# Patient Record
Sex: MALE | Race: WHITE | NOT HISPANIC OR LATINO | URBAN - METROPOLITAN AREA
[De-identification: names, ages, dates, MRNs, and addresses within clinical notes are randomized per-mention and may not be internally consistent; named-entity substitution may affect disease eponyms.]

---

## 2022-09-13 ENCOUNTER — APPOINTMENT (RX ONLY)
Dept: URBAN - METROPOLITAN AREA CLINIC 164 | Facility: CLINIC | Age: 32
Setting detail: DERMATOLOGY
End: 2022-09-13

## 2022-09-13 DIAGNOSIS — L72.8 OTHER FOLLICULAR CYSTS OF THE SKIN AND SUBCUTANEOUS TISSUE: ICD-10-CM | Status: WELL CONTROLLED

## 2022-09-13 DIAGNOSIS — D18.0 HEMANGIOMA: ICD-10-CM

## 2022-09-13 DIAGNOSIS — L81.4 OTHER MELANIN HYPERPIGMENTATION: ICD-10-CM

## 2022-09-13 DIAGNOSIS — D22 MELANOCYTIC NEVI: ICD-10-CM

## 2022-09-13 PROBLEM — D18.01 HEMANGIOMA OF SKIN AND SUBCUTANEOUS TISSUE: Status: ACTIVE | Noted: 2022-09-13

## 2022-09-13 PROBLEM — D22.5 MELANOCYTIC NEVI OF TRUNK: Status: ACTIVE | Noted: 2022-09-13

## 2022-09-13 PROCEDURE — ? CONSULTATION EXCISION

## 2022-09-13 PROCEDURE — ? FULL BODY SKIN EXAM

## 2022-09-13 PROCEDURE — 99203 OFFICE O/P NEW LOW 30 MIN: CPT

## 2022-09-13 PROCEDURE — ? COUNSELING

## 2022-09-13 ASSESSMENT — LOCATION SIMPLE DESCRIPTION DERM
LOCATION SIMPLE: LEFT FOREARM
LOCATION SIMPLE: LEFT UPPER BACK
LOCATION SIMPLE: CHEST
LOCATION SIMPLE: RIGHT UPPER BACK

## 2022-09-13 ASSESSMENT — LOCATION DETAILED DESCRIPTION DERM
LOCATION DETAILED: RIGHT MID-UPPER BACK
LOCATION DETAILED: LEFT MID-UPPER BACK
LOCATION DETAILED: LEFT LATERAL SUPERIOR CHEST
LOCATION DETAILED: LEFT DISTAL RADIAL DORSAL FOREARM

## 2022-09-13 ASSESSMENT — LOCATION ZONE DERM
LOCATION ZONE: TRUNK
LOCATION ZONE: ARM

## 2023-03-01 ENCOUNTER — APPOINTMENT (OUTPATIENT)
Dept: LAB | Facility: CLINIC | Age: 33
End: 2023-03-01

## 2023-03-01 ENCOUNTER — APPOINTMENT (OUTPATIENT)
Dept: RADIOLOGY | Facility: CLINIC | Age: 33
End: 2023-03-01

## 2023-03-01 DIAGNOSIS — Z02.1 PRE-EMPLOYMENT HEALTH SCREENING EXAMINATION: ICD-10-CM

## 2023-03-02 LAB — ZPP RBC-MCNC: 32 UG/DL (ref 0–36)

## 2023-03-06 LAB — MISCELLANEOUS LAB TEST RESULT: NORMAL

## 2023-08-24 ENCOUNTER — OFFICE VISIT (OUTPATIENT)
Dept: FAMILY MEDICINE CLINIC | Facility: CLINIC | Age: 33
End: 2023-08-24
Payer: COMMERCIAL

## 2023-08-24 ENCOUNTER — TELEPHONE (OUTPATIENT)
Dept: FAMILY MEDICINE CLINIC | Facility: CLINIC | Age: 33
End: 2023-08-24

## 2023-08-24 VITALS
HEIGHT: 69 IN | SYSTOLIC BLOOD PRESSURE: 140 MMHG | TEMPERATURE: 97.5 F | OXYGEN SATURATION: 98 % | WEIGHT: 193 LBS | BODY MASS INDEX: 28.58 KG/M2 | RESPIRATION RATE: 16 BRPM | DIASTOLIC BLOOD PRESSURE: 80 MMHG | HEART RATE: 57 BPM

## 2023-08-24 DIAGNOSIS — Z11.4 SCREENING FOR HIV (HUMAN IMMUNODEFICIENCY VIRUS): ICD-10-CM

## 2023-08-24 DIAGNOSIS — Z13.6 SCREENING FOR CARDIOVASCULAR CONDITION: ICD-10-CM

## 2023-08-24 DIAGNOSIS — T63.444A ANAPHYLACTIC REACTION TO BEE STING, UNDETERMINED INTENT, INITIAL ENCOUNTER: ICD-10-CM

## 2023-08-24 DIAGNOSIS — Z30.09 SCREENING AND EVALUATION FOR VASECTOMY: ICD-10-CM

## 2023-08-24 DIAGNOSIS — Z00.00 WELL ADULT EXAM: Primary | ICD-10-CM

## 2023-08-24 DIAGNOSIS — Z11.59 NEED FOR HEPATITIS C SCREENING TEST: ICD-10-CM

## 2023-08-24 PROBLEM — T78.2XXA BEE STING-INDUCED ANAPHYLAXIS: Status: ACTIVE | Noted: 2023-08-24

## 2023-08-24 PROBLEM — T63.441A BEE STING-INDUCED ANAPHYLAXIS: Status: ACTIVE | Noted: 2023-08-24

## 2023-08-24 PROBLEM — E80.6 HYPERBILIRUBINEMIA: Status: ACTIVE | Noted: 2023-08-24

## 2023-08-24 PROCEDURE — 99385 PREV VISIT NEW AGE 18-39: CPT | Performed by: FAMILY MEDICINE

## 2023-08-24 RX ORDER — EPINEPHRINE 0.3 MG/.3ML
0.3 INJECTION SUBCUTANEOUS ONCE
Qty: 0.6 ML | Refills: 5 | Status: SHIPPED | OUTPATIENT
Start: 2023-08-24 | End: 2023-08-24

## 2023-08-24 NOTE — PROGRESS NOTES
FAMILY PRACTICE HEALTH MAINTENANCE OFFICE VISIT  [facility] - [facility]    NAME: Cristobal Davila  AGE: 29 y.o. SEX: male  : 1990     DATE: 2023    Assessment and Plan     1. Well adult exam    2. Anaphylactic reaction to bee sting, undetermined intent, initial encounter  -     EPINEPHrine (EpiPen 2-Onesimo) 0.3 mg/0.3 mL SOAJ; Inject 0.3 mL (0.3 mg total) into a muscle once for 1 dose Bee allergy    3. Screening for cardiovascular condition  -     CBC; Future  -     Comprehensive metabolic panel; Future  -     Lipid Panel with Direct LDL reflex; Future    4. Need for hepatitis C screening test  -     Hepatitis C antibody; Future    5. Screening for HIV (human immunodeficiency virus)  -     [facility]; Future    6. BMI 28.0-28.9,adult    7. Screening and evaluation for vasectomy  -     Ambulatory Referral to Urology; Future        Patient Counseling:   Nutrition: Stressed importance of a well balanced diet, moderation of sodium/saturated fat, caloric balance and sufficient intake of fiber  Exercise: Stressed the importance of regular exercise with a goal of 150 minutes per week  Dental Health: Discussed daily flossing and brushing and regular dental visits     Immunizations reviewed: Up To Date  Discussed benefits of:  Screening labs. BMI Counseling: Body mass index is 28.5 kg/m². Discussed with patient's BMI with him. The BMI is above normal. Nutrition recommendations include decreasing overall calorie intake.     Return in about 1 year (around 2024) for Annual physical.        Chief Complaint     Chief Complaint   Patient presents with   • Physical Exam     NPT  [id number]       History of Present Illness     Pt is here for the first time for a full physical  [facility]      Pt would like a [facility]      Well Adult Physical   Patient here for a comprehensive physical exam.      Diet and Physical Activity  Diet: well balanced diet  Exercise: frequently      Depression Screen  PHQ-2/9 Depression Screening    Little interest or pleasure in doing things: 0 - not at all  Feeling down, depressed, or hopeless: 0 - not at all  PHQ-2 Score: 0  PHQ-2 Interpretation: Negative depression screen          General Health  Hearing: Normal:  bilateral  Vision: no vision problems  Dental: regular dental visits    Reproductive Health  No issues       The following portions of the patient's history were reviewed and updated as appropriate: allergies, current medications, past family history, past medical history, past social history, past surgical history and problem list.    Review of Systems     Review of Systems   Constitutional: Negative for activity change, appetite change, chills, diaphoresis, fatigue, fever and unexpected weight change. HENT: Negative for congestion, dental problem, ear pain, mouth sores, sinus pressure, sinus pain, sore throat and trouble swallowing. Eyes: Negative for photophobia, discharge and itching. Respiratory: Negative for apnea, chest tightness and shortness of breath. Cardiovascular: Negative for chest pain, palpitations and leg swelling. Gastrointestinal: Negative for abdominal distention, abdominal pain, blood in stool, nausea and vomiting. Endocrine: Negative for cold intolerance, heat intolerance, polydipsia, polyphagia and polyuria. Genitourinary: Negative for difficulty urinating. Musculoskeletal: Negative for arthralgias. Skin: Negative for color change and wound. Neurological: Negative for dizziness, syncope, speech difficulty and headaches. Hematological: Negative for adenopathy. Psychiatric/Behavioral: Negative for agitation and behavioral problems. Past Medical History     History reviewed. No pertinent past medical history.     Past Surgical History     Past Surgical History:   Procedure Laterality Date   • ANTERIOR CRUCIATE LIGAMENT REPAIR Bilateral    • NASAL SEPTUM SURGERY N/A • WISDOM TOOTH EXTRACTION N/A        Social History     Social History     Socioeconomic History   • Marital status: Unknown     Spouse name: None   • Number of children: None   • Years of education: None   • Highest education level: None   Occupational History   • None   Tobacco Use   • Smoking status: Never     Passive exposure: Never   • Smokeless tobacco: Never   Substance and Sexual Activity   • Alcohol use: Yes     Alcohol/week: 1.0 standard drink of alcohol     Types: 1 Cans of beer per week   • Drug use: Never   • Sexual activity: Yes   Other Topics Concern   • None   Social History Narrative   • None     Social Determinants of Health     Financial Resource Strain: Not on file   Food Insecurity: Not on file   Transportation Needs: Not on file   Physical Activity: Not on file   Stress: Not on file   Social Connections: Not on file   Intimate Partner Violence: Not on file   Housing Stability: Not on file       Family History     Family History   Problem Relation Age of Onset   • No Known Problems Mother    • Hypertension Father    • Breast cancer Sister    • Mental illness Neg Hx        Current Medications       Current Outpatient Medications:   •  EPINEPHrine (EpiPen 2-Onesimo) 0.3 mg/0.3 mL SOAJ, Inject 0.3 mL (0.3 mg total) into a muscle once for 1 dose Bee allergy, Disp: 0.6 mL, Rfl: 5  •  Multiple Vitamin (MULTIVITAMIN ADULT PO), Take by mouth, Disp: , Rfl:      Allergies     No Known Allergies    Objective     /80 (BP Location: Right arm, Patient Position: Sitting, Cuff Size: Standard)   Pulse 57   Temp 97.5 °F (36.4 °C) (Temporal)   Resp 16   Ht 5' 9" (1.753 m)   Wt 87.5 kg (193 lb)   SpO2 98%   BMI 28.50 kg/m²      Physical Exam  Vitals and nursing note reviewed. Constitutional:       General: He is not in acute distress. Appearance: He is well-developed. He is not diaphoretic. HENT:      Head: Normocephalic and atraumatic.       Right Ear: External ear normal.      Left Ear: External ear normal.      Nose: Nose normal.      Mouth/Throat:      Pharynx: No oropharyngeal exudate. Eyes:      General: No scleral icterus. Right eye: No discharge. Left eye: No discharge. Pupils: Pupils are equal, round, and reactive to light. Neck:      Thyroid: No thyromegaly. Cardiovascular:      Rate and Rhythm: Normal rate. Heart sounds: Normal heart sounds. No murmur heard. Pulmonary:      Effort: Pulmonary effort is normal. No respiratory distress. Breath sounds: Normal breath sounds. No wheezing. Abdominal:      General: Bowel sounds are normal. There is no distension. Palpations: Abdomen is soft. There is no mass. Tenderness: There is no abdominal tenderness. There is no guarding or rebound. Musculoskeletal:         General: Normal range of motion. Skin:     General: Skin is warm and dry. Findings: No erythema or rash. Neurological:      Mental Status: He is alert. Coordination: Coordination normal.      Deep Tendon Reflexes: Reflexes normal.   Psychiatric:         Behavior: Behavior normal.           Vision Screening    Right eye Left eye Both eyes   Without correction 20/20 20/20 20/20   With correction              Jose Cruz Murrya [facility]  BMI Counseling: Body mass index is 28.5 kg/m². The BMI is above normal. Nutrition recommendations include reducing portion sizes.

## 2023-08-24 NOTE — TELEPHONE ENCOUNTER
Patient was here this morning and got an RX for an epipen. The pharmacy called and stated that a prior auth is needed for it. Please start a prior auth and then update patient once completed.     Phone [phone]       [address] [place] [address]

## 2023-08-24 NOTE — TELEPHONE ENCOUNTER
Dalia Rivera [phone]  ID [id number]  PA done over the phone  Difficulties with phone number and address. Need Clarification with pt.   Pharmacy Insurance and Office are not the same in address  ID does not seem to be running on CIgna's end Will retry tomorrow   Zechariah Hotter

## 2023-08-24 NOTE — PATIENT INSTRUCTIONS
Weight Management   AMBULATORY CARE:   Why it is important to manage your weight:  Being overweight increases your risk of health conditions such as heart disease, high blood pressure, type 2 diabetes, and certain types of cancer. It can also increase your risk for osteoarthritis, sleep apnea, and other respiratory problems. Aim for a slow, steady weight loss. Even a small amount of weight loss can lower your risk of health problems. Risks of being overweight:  Extra weight can cause many health problems, including the following:  • Diabetes (high blood sugar level)    • High blood pressure or high cholesterol    • Heart disease    • Stroke    • Gallbladder or liver disease    • Cancer of the colon, breast, prostate, liver, or kidney    • Sleep apnea    • Arthritis or gout    Screening  is done to check for health conditions before you have signs or symptoms. If you are 28to 79years old, your blood sugar level may be checked every 3 years for signs of prediabetes or diabetes. Your healthcare provider will check your blood pressure at each visit. High blood pressure can lead to a stroke or other problems. Your provider may check for signs of heart disease, cancer, or other health problems. How to lose weight safely:  A safe and healthy way to lose weight is to eat fewer calories and get regular exercise. • You can lose up about 1 pound a week by decreasing the number of calories you eat by 500 calories each day. You can decrease calories by eating smaller portion sizes or by cutting out high-calorie foods. Read labels to find out how many calories are in the foods you eat. • You can also burn calories with exercise such as walking, swimming, or biking. You will be more likely to keep weight off if you make these changes part of your lifestyle. Exercise at least 30 minutes per day on most days of the week.  You can also fit in more physical activity by taking the stairs instead of the elevator or parking farther away from stores. Ask your healthcare provider about the best exercise plan for you. Healthy meal plan for weight management:  A healthy meal plan includes a variety of foods, contains fewer calories, and helps you stay healthy. A healthy meal plan includes the following:     • Eat whole-grain foods more often. A healthy meal plan should contain fiber. Fiber is the part of grains, fruits, and vegetables that is not broken down by your body. Whole-grain foods are healthy and provide extra fiber in your diet. Some examples of whole-grain foods are whole-wheat breads and pastas, oatmeal, brown rice, and bulgur. • Eat a variety of vegetables every day. Include dark, leafy greens such as spinach, kale, maria c greens, and mustard greens. Eat yellow and orange vegetables such as carrots, sweet potatoes, and winter squash. • Eat a variety of fruits every day. Choose fresh or canned fruit (canned in its own juice or light syrup) instead of juice. Fruit juice has very little or no fiber. • Eat low-fat dairy foods. Drink fat-free (skim) milk or 1% milk. Eat fat-free yogurt and low-fat cottage cheese. Try low-fat cheeses such as mozzarella and other reduced-fat cheeses. • Choose meat and other protein foods that are low in fat. Choose beans or other legumes such as split peas or lentils. Choose fish, skinless poultry (chicken or turkey), or lean cuts of red meat (beef or pork). Before you cook meat or poultry, cut off any visible fat. • Use less fat and oil. Try baking foods instead of frying them. Add less fat, such as margarine, sour cream, regular salad dressing and mayonnaise to foods. Eat fewer high-fat foods. Some examples of high-fat foods include french fries, doughnuts, ice cream, and cakes. • Eat fewer sweets. Limit foods and drinks that are high in sugar. This includes candy, cookies, regular soda, and sweetened drinks. Ways to decrease calories:   • Eat smaller portions. ? Use a small plate with smaller servings. ? Do not eat second helpings. ? When you eat at a restaurant, ask for a box and place half of your meal in the box before you eat. ? Share an entrée with someone else. • Replace high-calorie snacks with healthy, low-calorie snacks. ? Choose fresh fruit, vegetables, fat-free rice cakes, or air-popped popcorn instead of potato chips, nuts, or chocolate. ? Choose water or calorie-free drinks instead of soda or sweetened drinks. • Do not shop for groceries when you are hungry. You may be more likely to make unhealthy food choices. Take a grocery list of healthy foods and shop after you have eaten. • Eat regular meals. Do not skip meals. Skipping meals can lead to overeating later in the day. This can make it harder for you to lose weight. Eat a healthy snack in place of a meal if you do not have time to eat a regular meal. Talk with a dietitian to help you create a meal plan and schedule that is right for you. Other things to consider as you try to lose weight:   • Be aware of situations that may give you the urge to overeat, such as eating while watching television. Find ways to avoid these situations. For example, read a book, go for a walk, or do crafts. • Meet with a weight loss support group or friends who are also trying to lose weight. This may help you stay motivated to continue working on your weight loss goals. © Copyright [employer] 2022 Information is for End User's use only and may not be sold, redistributed or otherwise used for commercial purposes. The above information is an  only. It is not intended as medical advice for individual conditions or treatments. Talk to your doctor, nurse or pharmacist before following any medical regimen to see if it is safe and effective for you.

## 2023-08-25 NOTE — TELEPHONE ENCOUNTER
Message left for a call back.   Clarification needed on address phone and ID  Needing to ask about phone numbers on back of Insurance as well to call for PA approval   warren

## 2023-08-29 NOTE — TELEPHONE ENCOUNTER
Pt called today giving correct address and verified Insurance card.   Will check on Epi now that all is correct on file.  Elizabeth Lord

## 2023-08-30 NOTE — TELEPHONE ENCOUNTER
PA was approved [facility] is ordering the correct covered brand  Message left for the pt to  tomorrow in the afternoon.   NFA  [id number]

## 2023-11-09 ENCOUNTER — TELEPHONE (OUTPATIENT)
Dept: FAMILY MEDICINE CLINIC | Facility: CLINIC | Age: 33
End: 2023-11-09

## 2023-11-09 DIAGNOSIS — E78.00 ELEVATED LOW-DENSITY LIPOPROTEIN LEVEL: ICD-10-CM

## 2023-11-09 DIAGNOSIS — R74.8 ABNORMAL LIVER ENZYMES: Primary | ICD-10-CM

## 2023-11-09 LAB
ALBUMIN SERPL-MCNC: 4.5 G/DL (ref 4.1–5.1)
ALBUMIN/GLOB SERPL: 1.6 {RATIO} (ref 1.2–2.2)
ALP SERPL-CCNC: 62 IU/L (ref 44–121)
ALT SERPL-CCNC: 56 IU/L (ref 0–44)
AST SERPL-CCNC: 34 IU/L (ref 0–40)
BASOPHILS # BLD AUTO: 0 X10E3/UL (ref 0–0.2)
BASOPHILS NFR BLD AUTO: 0 %
BILIRUB SERPL-MCNC: 2.4 MG/DL (ref 0–1.2)
BUN SERPL-MCNC: 16 MG/DL (ref 6–20)
BUN/CREAT SERPL: 19 (ref 9–20)
CALCIUM SERPL-MCNC: 10.2 MG/DL (ref 8.7–10.2)
CHLORIDE SERPL-SCNC: 102 MMOL/L (ref 96–106)
CHOLEST SERPL-MCNC: 178 MG/DL (ref 100–199)
CO2 SERPL-SCNC: 23 MMOL/L (ref 20–29)
CREAT SERPL-MCNC: 0.83 MG/DL (ref 0.76–1.27)
EGFR: 120 ML/MIN/1.73
EOSINOPHIL # BLD AUTO: 0.1 X10E3/UL (ref 0–0.4)
EOSINOPHIL NFR BLD AUTO: 2 %
ERYTHROCYTE [DISTWIDTH] IN BLOOD BY AUTOMATED COUNT: 13.1 % (ref 11.6–15.4)
GLOBULIN SER-MCNC: 2.9 G/DL (ref 1.5–4.5)
GLUCOSE SERPL-MCNC: 96 MG/DL (ref 70–99)
HCT VFR BLD AUTO: 48 % (ref 37.5–51)
HCV AB S/CO SERPL IA: NON REACTIVE
HDLC SERPL-MCNC: 52 MG/DL
HGB BLD-MCNC: 16.5 G/DL (ref 13–17.7)
HIV 1+2 AB+HIV1 P24 AG SERPL QL IA: NON REACTIVE
IMM GRANULOCYTES # BLD: 0 X10E3/UL (ref 0–0.1)
IMM GRANULOCYTES NFR BLD: 0 %
LDLC SERPL CALC-MCNC: 108 MG/DL (ref 0–99)
LYMPHOCYTES # BLD AUTO: 2 X10E3/UL (ref 0.7–3.1)
LYMPHOCYTES NFR BLD AUTO: 28 %
MCH RBC QN AUTO: 30.1 PG (ref 26.6–33)
MCHC RBC AUTO-ENTMCNC: 34.4 G/DL (ref 31.5–35.7)
MCV RBC AUTO: 87 FL (ref 79–97)
MICRODELETION SYND BLD/T FISH: NORMAL
MONOCYTES # BLD AUTO: 0.8 X10E3/UL (ref 0.1–0.9)
MONOCYTES NFR BLD AUTO: 10 %
NEUTROPHILS # BLD AUTO: 4.4 X10E3/UL (ref 1.4–7)
NEUTROPHILS NFR BLD AUTO: 60 %
PLATELET # BLD AUTO: 222 X10E3/UL (ref 150–450)
POTASSIUM SERPL-SCNC: 4.6 MMOL/L (ref 3.5–5.2)
PROT SERPL-MCNC: 7.4 G/DL (ref 6–8.5)
RBC # BLD AUTO: 5.49 X10E6/UL (ref 4.14–5.8)
SODIUM SERPL-SCNC: 140 MMOL/L (ref 134–144)
TRIGL SERPL-MCNC: 101 MG/DL (ref 0–149)
WBC # BLD AUTO: 7.3 X10E3/UL (ref 3.4–10.8)

## 2023-11-09 NOTE — TELEPHONE ENCOUNTER
Please call pt, one of his liver functions studies were elevatyed. This is likely fatty liver which is a benign condition. I will order and Ul;trasound of the liver to confirm. Pt also has an elevated LDL - which is the bad cholesterol.   I would suggets low fat low cholesterol diet and we will redraw in 6 months    Orders in

## 2023-11-14 ENCOUNTER — HOSPITAL ENCOUNTER (OUTPATIENT)
Dept: ULTRASOUND IMAGING | Facility: HOSPITAL | Age: 33
Discharge: HOME | End: 2023-11-14
Payer: COMMERCIAL

## 2023-11-14 DIAGNOSIS — R74.8 ABNORMAL LIVER ENZYMES: ICD-10-CM

## 2023-11-14 PROCEDURE — 76705 ECHO EXAM OF ABDOMEN: CPT

## 2024-05-06 ENCOUNTER — OFFICE VISIT (OUTPATIENT)
Dept: FAMILY MEDICINE CLINIC | Facility: CLINIC | Age: 34
End: 2024-05-06
Payer: COMMERCIAL

## 2024-05-06 VITALS
RESPIRATION RATE: 18 BRPM | HEART RATE: 62 BPM | WEIGHT: 210 LBS | TEMPERATURE: 97.8 F | SYSTOLIC BLOOD PRESSURE: 108 MMHG | DIASTOLIC BLOOD PRESSURE: 68 MMHG | HEIGHT: 69 IN | BODY MASS INDEX: 31.1 KG/M2

## 2024-05-06 DIAGNOSIS — J01.00 ACUTE NON-RECURRENT MAXILLARY SINUSITIS: ICD-10-CM

## 2024-05-06 DIAGNOSIS — J30.2 SEASONAL ALLERGIES: Primary | ICD-10-CM

## 2024-05-06 PROCEDURE — 99213 OFFICE O/P EST LOW 20 MIN: CPT | Performed by: FAMILY MEDICINE

## 2024-05-06 RX ORDER — AZITHROMYCIN 250 MG/1
TABLET, FILM COATED ORAL
Qty: 6 TABLET | Refills: 0 | Status: SHIPPED | OUTPATIENT
Start: 2024-05-06 | End: 2024-05-11

## 2024-05-06 RX ORDER — FLUTICASONE PROPIONATE 50 MCG
50 SPRAY, SUSPENSION (ML) NASAL DAILY
COMMUNITY
Start: 2024-02-21

## 2024-05-06 RX ORDER — METHYLPREDNISOLONE 4 MG/1
TABLET ORAL
Qty: 21 EACH | Refills: 0 | Status: SHIPPED | OUTPATIENT
Start: 2024-05-06 | End: 2024-05-17 | Stop reason: ALTCHOICE

## 2024-05-06 RX ORDER — LORATADINE 10 MG/1
10 TABLET ORAL DAILY
COMMUNITY

## 2024-05-06 RX ORDER — MONTELUKAST SODIUM 10 MG/1
10 TABLET ORAL
Qty: 90 TABLET | Refills: 0 | Status: SHIPPED | OUTPATIENT
Start: 2024-05-06

## 2024-05-06 NOTE — PROGRESS NOTES
Assessment/Plan:    1. Seasonal allergies  -     montelukast (SINGULAIR) 10 mg tablet; Take 1 tablet (10 mg total) by mouth daily at bedtime    2. Acute non-recurrent maxillary sinusitis  -     methylPREDNISolone 4 MG tablet therapy pack; Use as directed on package  -     azithromycin (ZITHROMAX) 250 mg tablet; 2 tabs on day 1, 1 tab a day for 4 days after            There are no Patient Instructions on file for this visit.    No follow-ups on file.    Subjective:      Patient ID: Akira Lucero is a 33 y.o. male.    Chief Complaint   Patient presents with   • seasonal allergies     Symptoms started Friday  YC   • Sore Throat     Painful to swallow   • Earache     Both ears; usually when laying down    • Cough       Pt states he startted with allergies a few years ago  Continuously yearly had dry scratchy throat, sinus oressure,   Warm comopresses help some  Then mucousy congestion, chest congestion          The following portions of the patient's history were reviewed and updated as appropriate: allergies, current medications, past family history, past medical history, past social history, past surgical history and problem list.    Review of Systems   HENT:  Positive for congestion, ear pain, postnasal drip, sinus pain and sore throat.          Current Outpatient Medications   Medication Sig Dispense Refill   • azithromycin (ZITHROMAX) 250 mg tablet 2 tabs on day 1, 1 tab a day for 4 days after 6 tablet 0   • EPINEPHrine (EpiPen 2-Onesimo) 0.3 mg/0.3 mL SOAJ Inject 0.3 mL (0.3 mg total) into a muscle once for 1 dose Bee allergy 0.6 mL 5   • fluticasone (FLONASE) 50 mcg/act nasal spray 50 mcg into each nostril daily     • loratadine (CLARITIN) 10 mg tablet Take 10 mg by mouth daily     • methylPREDNISolone 4 MG tablet therapy pack Use as directed on package 21 each 0   • montelukast (SINGULAIR) 10 mg tablet Take 1 tablet (10 mg total) by mouth daily at bedtime 90 tablet 0   • Multiple Vitamin (MULTIVITAMIN ADULT PO) Take  "by mouth       No current facility-administered medications for this visit.       Objective:    /68   Pulse 62   Temp 97.8 °F (36.6 °C) (Tympanic)   Resp 18   Ht 5' 9\" (1.753 m)   Wt 95.3 kg (210 lb)   BMI 31.01 kg/m²        Physical Exam  HENT:      Nose: Congestion and rhinorrhea present.                Frank Lombardi, DO  "

## 2024-05-17 ENCOUNTER — OFFICE VISIT (OUTPATIENT)
Dept: URGENT CARE | Facility: CLINIC | Age: 34
End: 2024-05-17
Payer: COMMERCIAL

## 2024-05-17 VITALS
BODY MASS INDEX: 30.57 KG/M2 | WEIGHT: 207 LBS | SYSTOLIC BLOOD PRESSURE: 122 MMHG | RESPIRATION RATE: 20 BRPM | DIASTOLIC BLOOD PRESSURE: 80 MMHG | HEART RATE: 64 BPM | OXYGEN SATURATION: 97 % | TEMPERATURE: 97.6 F

## 2024-05-17 DIAGNOSIS — J01.11 ACUTE RECURRENT FRONTAL SINUSITIS: Primary | ICD-10-CM

## 2024-05-17 DIAGNOSIS — R05.1 ACUTE COUGH: ICD-10-CM

## 2024-05-17 PROCEDURE — 99213 OFFICE O/P EST LOW 20 MIN: CPT

## 2024-05-17 PROCEDURE — 87636 SARSCOV2 & INF A&B AMP PRB: CPT

## 2024-05-17 RX ORDER — PREDNISONE 20 MG/1
40 TABLET ORAL DAILY
Qty: 10 TABLET | Refills: 0 | Status: SHIPPED | OUTPATIENT
Start: 2024-05-17 | End: 2024-05-22

## 2024-05-17 RX ORDER — AMOXICILLIN AND CLAVULANATE POTASSIUM 875; 125 MG/1; MG/1
1 TABLET, FILM COATED ORAL EVERY 12 HOURS SCHEDULED
Qty: 14 TABLET | Refills: 0 | Status: SHIPPED | OUTPATIENT
Start: 2024-05-17 | End: 2024-05-24

## 2024-05-17 NOTE — PROGRESS NOTES
[facility]        NAME: Akira Lucero is a 33 y.o. male  : 1990    MRN: [id number]  DATE: May 17, 2024  TIME: 12:43 PM    Assessment and Plan   Acute recurrent frontal sinusitis [J01.11]  1. Acute recurrent frontal sinusitis  amoxicillin-clavulanate (AUGMENTIN) 875-125 mg per tablet    predniSONE 20 mg tablet      2. Acute cough  Covid/Flu- Office Collect Normal        COVID/flu PCR sent to lab, will f/u if positive. Will trial alternative antibiotic and steroids as patient is still symptomatic. Advised to stop taking antibiotic if positive for COVID or flu. VSS in clinic, appears in no acute distress. Educated on use of OTC products for additional relief of symptoms. Advised close follow-up with PCP or to report to the ER if symptoms worsen. Patient verbalizes understanding and agreeable to plan.       Patient Instructions     Take antibiotic as directed for next week and steroids as directed for next 5 days. Complete entire course of therapy even if symptoms improve and take medication with food to avoid upset stomach. If positive for COVID or flu, stop taking antibiotic. May continue over-the-counter/prescribed products as previously directed. Follow-up with PCP in 3-5 days if no improvement of symptoms. Report to the ER if symptoms worsen.      If tests are performed, our office will contact you with results only if changes need to made to the care plan discussed with you at the visit. You can review your full results on [facility].    Chief Complaint     Chief Complaint   Patient presents with    Facial Pain    Earache     Was seen for sore throat  and treated with antibiotic, steroids and singular. Allergies did not resolve , but infection did resolve. Went on airplane flew out on  and returned on Wednesday reports increase in sinus pressure b/l clear overnight and pressure returned to right ear. Ear pressure resolved, now right eye pressure.   + nasal congestion ,patient is  using nasal wash several times daily. No fevers reported. Taking sudafed, antihistamine, Flonase and afrin which is not helping.     Nasal Congestion     Last dose of Sudafed this morning. Last dose of advil @ 10:00 am          History of Present Illness       33 year old male presents for evaluation of cough, ear pain, and congestion for the past 2 weeks. He was seen by his PCP 2 weeks ago and given a medrol dose pack and Zithromax. He reports mild improvement of symptoms but this past week he was on a plane and relates the pressure, congestion, and ear pain returned and has since worsened. He denies fevers, chills, body aches, or fatigue. He reports yesterday he went running and felt SOB that resolved within a few seconds but otherwise denies difficulty breathing. He has tried flonase, nasal saline, zyrtec, and Singulair with minimal relief.     Sinusitis  This is a recurrent problem. The current episode started 1 to 4 weeks ago. The problem is unchanged. There has been no fever. The pain is moderate. Associated symptoms include congestion, coughing, ear pain, headaches, sinus pressure and sneezing. Pertinent negatives include no chills, hoarse voice, neck pain, shortness of breath, sore throat or swollen glands. Past treatments include oral decongestants, nasal decongestants, antibiotics and saline nose sprays. The treatment provided mild relief.       Review of Systems   Review of Systems   Constitutional:  Negative for activity change, appetite change, chills, fatigue and fever.   HENT:  Positive for congestion, ear pain, postnasal drip, rhinorrhea, sinus pressure, sinus pain and sneezing. Negative for hoarse voice, sore throat and trouble swallowing.    Respiratory:  Positive for cough. Negative for chest tightness and shortness of breath.    Gastrointestinal:  Negative for abdominal pain, constipation, diarrhea, nausea and vomiting.   Musculoskeletal:  Negative for arthralgias, back pain, myalgias and neck  pain.   Skin:  Negative for color change and pallor.   Allergic/Immunologic: Positive for environmental allergies. Negative for food allergies.   Neurological:  Positive for headaches. Negative for dizziness and light-headedness.         Current Medications       Current Outpatient Medications:     amoxicillin-clavulanate (AUGMENTIN) 875-125 mg per tablet, Take 1 tablet by mouth every 12 (twelve) hours for 7 days, Disp: 14 tablet, Rfl: 0    fluticasone (FLONASE) 50 mcg/act nasal spray, 50 mcg into each nostril daily, Disp: , Rfl:     loratadine (CLARITIN) 10 mg tablet, Take 10 mg by mouth daily, Disp: , Rfl:     montelukast (SINGULAIR) 10 mg tablet, Take 1 tablet (10 mg total) by mouth daily at bedtime, Disp: 90 tablet, Rfl: 0    Multiple Vitamin (MULTIVITAMIN ADULT PO), Take by mouth, Disp: , Rfl:     predniSONE 20 mg tablet, Take 2 tablets (40 mg total) by mouth daily for 5 days, Disp: 10 tablet, Rfl: 0    EPINEPHrine (EpiPen 2-Onesimo) 0.3 mg/0.3 mL SOAJ, Inject 0.3 mL (0.3 mg total) into a muscle once for 1 dose Bee allergy, Disp: 0.6 mL, Rfl: 5    Current Allergies     Allergies as of 05/17/2024 - Reviewed 05/17/2024   Allergen Reaction Noted    Bee venom Hives 05/06/2024            The following portions of the patient's history were reviewed and updated as appropriate: allergies, current medications, past family history, past medical history, past social history, past surgical history and problem list.     History reviewed. No pertinent past medical history.    Past Surgical History:   Procedure Laterality Date    ANTERIOR CRUCIATE LIGAMENT REPAIR Bilateral     NASAL SEPTUM SURGERY N/A     WISDOM TOOTH EXTRACTION N/A        Family History   Problem Relation Age of Onset    No Known Problems Mother     Hypertension Father     Breast cancer Sister     Mental illness Neg Hx          Medications have been verified.        Objective   /80   Pulse 64   Temp 97.6 °F (36.4 °C) (Tympanic)   Resp 20   Wt 93.9 kg  (207 lb)   SpO2 97%   BMI 30.57 kg/m²        Physical Exam     Physical Exam  Vitals and nursing note reviewed.   Constitutional:       General: He is awake. He is not in acute distress.     Appearance: Normal appearance. He is well-developed and normal weight.   HENT:      Head: Normocephalic and atraumatic.      Right Ear: Hearing, ear canal and external ear normal. A middle ear effusion is present.      Left Ear: Hearing, ear canal and external ear normal. A middle ear effusion is present.      Nose: Congestion and rhinorrhea present. Rhinorrhea is clear.      Right Turbinates: Enlarged. Not swollen or pale.      Left Turbinates: Enlarged. Not swollen or pale.      Right Sinus: No maxillary sinus tenderness or frontal sinus tenderness.      Left Sinus: Maxillary sinus tenderness and frontal sinus tenderness present.      Mouth/Throat:      Lips: Pink.      Mouth: Mucous membranes are moist.      Pharynx: Oropharynx is clear. Uvula midline. No oropharyngeal exudate or posterior oropharyngeal erythema.   Eyes:      General: Allergic shiner present.         Right eye: No foreign body, discharge or hordeolum.         Left eye: Discharge present.No foreign body or hordeolum.      Conjunctiva/sclera:      Left eye: Left conjunctiva is injected.      Pupils: Pupils are equal, round, and reactive to light.      Comments: Left eye allergic shiners; watery discharge present   Cardiovascular:      Rate and Rhythm: Normal rate and regular rhythm.      Pulses: Normal pulses.      Heart sounds: Normal heart sounds.   Pulmonary:      Effort: Pulmonary effort is normal.      Breath sounds: Normal breath sounds.   Musculoskeletal:      Cervical back: Full passive range of motion without pain, normal range of motion and neck supple.   Lymphadenopathy:      Cervical: No cervical adenopathy.   Skin:     General: Skin is warm and dry.   Neurological:      General: No focal deficit present.      Mental Status: He is alert and  oriented to person, place, and time.   Psychiatric:         Mood and Affect: Mood normal.         Behavior: Behavior normal. Behavior is cooperative.         Thought Content: Thought content normal.         Judgment: Judgment normal.

## 2024-05-17 NOTE — PATIENT INSTRUCTIONS
Take antibiotic as directed for next week and steroids as directed for next 5 days. Complete entire course of therapy even if symptoms improve and take medication with food to avoid upset stomach. If positive for COVID or flu, stop taking antibiotic. May continue over-the-counter/prescribed products as previously directed. Follow-up with PCP in 3-5 days if no improvement of symptoms. Report to the ER if symptoms worsen.      If tests are performed, our office will contact you with results only if changes need to made to the care plan discussed with you at the visit. You can review your full results on [facility].

## 2024-05-20 LAB
FLUAV RNA RESP QL NAA+PROBE: NEGATIVE
FLUBV RNA RESP QL NAA+PROBE: NEGATIVE
SARS-COV-2 RNA RESP QL NAA+PROBE: NEGATIVE

## 2024-07-16 ENCOUNTER — TELEPHONE (OUTPATIENT)
Age: 34
End: 2024-07-16

## 2024-07-16 DIAGNOSIS — Z13.6 SCREENING FOR CARDIOVASCULAR CONDITION: Primary | ICD-10-CM

## 2024-07-16 NOTE — TELEPHONE ENCOUNTER
Patient has PHY on 8/29/24. He would like to have labs ordered prior to his visit, so they can be discussed at his visit.  If PCP could place orders for labs, he goes to [place].  He will need lab orders mailed to his home.  Thank you.

## 2024-08-20 LAB
ALBUMIN SERPL-MCNC: 4.6 G/DL (ref 4.1–5.1)
ALP SERPL-CCNC: 67 IU/L (ref 44–121)
ALT SERPL-CCNC: 58 IU/L (ref 0–44)
AST SERPL-CCNC: 28 IU/L (ref 0–40)
BILIRUB SERPL-MCNC: 2.7 MG/DL (ref 0–1.2)
BUN SERPL-MCNC: 19 MG/DL (ref 6–20)
BUN/CREAT SERPL: 19 (ref 9–20)
CALCIUM SERPL-MCNC: 10.1 MG/DL (ref 8.7–10.2)
CHLORIDE SERPL-SCNC: 102 MMOL/L (ref 96–106)
CHOLEST SERPL-MCNC: 216 MG/DL (ref 100–199)
CO2 SERPL-SCNC: 24 MMOL/L (ref 20–29)
CREAT SERPL-MCNC: 0.99 MG/DL (ref 0.76–1.27)
EGFR: 104 ML/MIN/1.73
GLOBULIN SER-MCNC: 2.6 G/DL (ref 1.5–4.5)
GLUCOSE SERPL-MCNC: 99 MG/DL (ref 70–99)
HDLC SERPL-MCNC: 47 MG/DL
LDLC SERPL CALC-MCNC: 137 MG/DL (ref 0–99)
LDLC/HDLC SERPL: 2.9 RATIO (ref 0–3.6)
MICRODELETION SYND BLD/T FISH: NORMAL
POTASSIUM SERPL-SCNC: 4.6 MMOL/L (ref 3.5–5.2)
PROT SERPL-MCNC: 7.2 G/DL (ref 6–8.5)
SL AMB VLDL CHOLESTEROL CALC: 32 MG/DL (ref 5–40)
SODIUM SERPL-SCNC: 138 MMOL/L (ref 134–144)
TRIGL SERPL-MCNC: 178 MG/DL (ref 0–149)

## 2024-08-29 ENCOUNTER — OFFICE VISIT (OUTPATIENT)
Dept: FAMILY MEDICINE CLINIC | Facility: CLINIC | Age: 34
End: 2024-08-29
Payer: COMMERCIAL

## 2024-08-29 ENCOUNTER — TELEPHONE (OUTPATIENT)
Age: 34
End: 2024-08-29

## 2024-08-29 VITALS
SYSTOLIC BLOOD PRESSURE: 122 MMHG | BODY MASS INDEX: 30.59 KG/M2 | HEIGHT: 69 IN | WEIGHT: 206.5 LBS | TEMPERATURE: 97.3 F | RESPIRATION RATE: 16 BRPM | HEART RATE: 48 BPM | DIASTOLIC BLOOD PRESSURE: 74 MMHG

## 2024-08-29 DIAGNOSIS — E80.4 GILBERT'S SYNDROME: ICD-10-CM

## 2024-08-29 DIAGNOSIS — T63.444A ANAPHYLACTIC REACTION TO BEE STING, UNDETERMINED INTENT, INITIAL ENCOUNTER: ICD-10-CM

## 2024-08-29 DIAGNOSIS — E78.2 MIXED HYPERLIPIDEMIA: ICD-10-CM

## 2024-08-29 DIAGNOSIS — Z00.00 WELL ADULT EXAM: Primary | ICD-10-CM

## 2024-08-29 PROBLEM — K76.0 HEPATIC STEATOSIS: Status: ACTIVE | Noted: 2024-08-29

## 2024-08-29 PROCEDURE — 99395 PREV VISIT EST AGE 18-39: CPT | Performed by: FAMILY MEDICINE

## 2024-08-29 RX ORDER — EPINEPHRINE 0.3 MG/.3ML
0.3 INJECTION SUBCUTANEOUS ONCE
Qty: 1.2 ML | Refills: 5 | Status: SHIPPED | OUTPATIENT
Start: 2024-08-29 | End: 2024-08-29

## 2024-08-29 NOTE — PROGRESS NOTES
[facility] HEALTH MAINTENANCE OFFICE VISIT  [facility]    NAME: Akira Lucero  AGE: 33 y.o. SEX: male  : 1990     DATE: 2024    Assessment and Plan     1. Well adult exam  2. Gilbert's syndrome  3. Mixed hyperlipidemia  -     Comprehensive metabolic panel; Future; Expected date: 02/10/2025  -     Lipid Panel with Direct LDL reflex; Future; Expected date: 02/10/2025  -     Comprehensive metabolic panel  -     Lipid Panel with Direct LDL reflex  4. Anaphylactic reaction to bee sting, undetermined intent, initial encounter  -     EPINEPHrine (EpiPen 2-Onesimo) 0.3 mg/0.3 mL SOAJ; Inject 0.3 mL (0.3 mg total) into a muscle once for 1 dose Bee allergy      Patient Counseling:   Nutrition: Stressed importance of a well balanced diet, moderation of sodium/saturated fat, caloric balance and sufficient intake of fiber  Exercise: Stressed the importance of regular exercise with a goal of 150 minutes per week  Dental Health: Discussed daily flossing and brushing and regular dental visits     Immunizations reviewed: Up To Date  Discussed benefits of:  Screening labs.  BMI Counseling: Body mass index is 30.94 kg/m². Discussed with patient's BMI with him. The BMI is above normal. Nutrition recommendations include reducing portion sizes.    Return in about 1 year (around 2025) for Annual physical.        Chief Complaint     Chief Complaint   Patient presents with   • Physical Exam     Veronica Elizabeth MA       History of Present Illness     Pt is sched for a full physical  Had labs    Pt does not drink much  Does not use lifting supplements          Well Adult Physical   Patient here for a comprehensive physical exam.      Diet and Physical Activity  Diet: well balanced diet  Exercise: frequently      Depression Screen  PHQ-2/9 Depression Screening            General Health  Hearing: Normal:  bilateral  Vision: no vision problems  Dental: regular dental visits    Reproductive  Health  No issues       The following portions of the patient's history were reviewed and updated as appropriate: allergies, current medications, past family history, past medical history, past social history, past surgical history and problem list.    Review of Systems     Review of Systems   Constitutional:  Negative for activity change, appetite change, chills, diaphoresis, fatigue, fever and unexpected weight change.   HENT:  Negative for congestion, dental problem, ear pain, mouth sores, sinus pressure, sinus pain, sore throat and trouble swallowing.    Eyes:  Negative for photophobia, discharge and itching.   Respiratory:  Negative for apnea, chest tightness and shortness of breath.    Cardiovascular:  Negative for chest pain, palpitations and leg swelling.   Gastrointestinal:  Negative for abdominal distention, abdominal pain, blood in stool, nausea and vomiting.   Endocrine: Negative for cold intolerance, heat intolerance, polydipsia, polyphagia and polyuria.   Genitourinary:  Negative for difficulty urinating.   Musculoskeletal:  Negative for arthralgias.   Skin:  Negative for color change and wound.   Neurological:  Negative for dizziness, syncope, speech difficulty and headaches.   Hematological:  Negative for adenopathy.   Psychiatric/Behavioral:  Negative for agitation and behavioral problems.        Past Medical History     No past medical history on file.    Past Surgical History     Past Surgical History:   Procedure Laterality Date   • ANTERIOR CRUCIATE LIGAMENT REPAIR Bilateral    • NASAL SEPTUM SURGERY N/A    • WISDOM TOOTH EXTRACTION N/A        Social History     Social History     Socioeconomic History   • Marital status: Unknown     Spouse name: None   • Number of children: None   • Years of education: None   • Highest education level: None   Occupational History   • None   Tobacco Use   • Smoking status: Never     Passive exposure: Never   • Smokeless tobacco: Never   Vaping Use   • Vaping  "status: Never Used   Substance and Sexual Activity   • Alcohol use: Yes     Alcohol/week: 1.0 standard drink of alcohol     Types: 1 Cans of beer per week   • Drug use: Never   • Sexual activity: Yes   Other Topics Concern   • None   Social History Narrative   • None     Social Determinants of Health     Financial Resource Strain: Not on file   Food Insecurity: Not on file   Transportation Needs: Not on file   Physical Activity: Not on file   Stress: Not on file   Social Connections: Not on file   Intimate Partner Violence: Not on file   Housing Stability: Not on file       Family History     Family History   Problem Relation Age of Onset   • No Known Problems Mother    • Hypertension Father    • Breast cancer Sister    • Mental illness Neg Hx        Current Medications       Current Outpatient Medications:   •  fluticasone (FLONASE) 50 mcg/act nasal spray, 50 mcg into each nostril if needed, Disp: , Rfl:   •  loratadine (CLARITIN) 10 mg tablet, Take 10 mg by mouth if needed, Disp: , Rfl:   •  montelukast (SINGULAIR) 10 mg tablet, Take 1 tablet (10 mg total) by mouth daily at bedtime (Patient taking differently: Take 10 mg by mouth if needed), Disp: 90 tablet, Rfl: 0  •  Multiple Vitamin (MULTIVITAMIN ADULT PO), Take by mouth, Disp: , Rfl:   •  EPINEPHrine (EpiPen 2-Onesimo) 0.3 mg/0.3 mL SOAJ, Inject 0.3 mL (0.3 mg total) into a muscle once for 1 dose Bee allergy, Disp: 1.2 mL, Rfl: 5     Allergies     Allergies   Allergen Reactions   • Bee Venom Hives       Objective     /74   Pulse (!) 48   Temp (!) 97.3 °F (36.3 °C)   Resp 16   Ht 5' 8.5\" (1.74 m)   Wt 93.7 kg (206 lb 8 oz)   BMI 30.94 kg/m²      Physical Exam  Vitals and nursing note reviewed.   Constitutional:       General: He is not in acute distress.     Appearance: He is well-developed. He is not diaphoretic.   HENT:      Head: Normocephalic and atraumatic.      Right Ear: External ear normal.      Left Ear: External ear normal.      Nose: Nose " normal.      Mouth/Throat:      Pharynx: No oropharyngeal exudate.   Eyes:      General: No scleral icterus.        Right eye: No discharge.         Left eye: No discharge.      Pupils: Pupils are equal, round, and reactive to light.   Neck:      Thyroid: No thyromegaly.   Cardiovascular:      Rate and Rhythm: Normal rate.      Heart sounds: Normal heart sounds. No murmur heard.  Pulmonary:      Effort: Pulmonary effort is normal. No respiratory distress.      Breath sounds: Normal breath sounds. No wheezing.   Abdominal:      General: Bowel sounds are normal. There is no distension.      Palpations: Abdomen is soft. There is no mass.      Tenderness: There is no abdominal tenderness. There is no guarding or rebound.   Musculoskeletal:         General: Normal range of motion.   Skin:     General: Skin is warm and dry.      Findings: No erythema or rash.   Neurological:      Mental Status: He is alert.      Coordination: Coordination normal.      Deep Tendon Reflexes: Reflexes normal.   Psychiatric:         Behavior: Behavior normal.           Vision Screening    Right eye Left eye Both eyes   Without correction 20/13 20/13 20/13   With correction              [facility]

## 2024-09-04 NOTE — TELEPHONE ENCOUNTER
PA for Epipen 0.3 mg/0.3 ml SUBMITTED     via    []CMM-KEY:   [x]Surescripts-Case ID #   []Faxed to plan   []Other website   []Phone call Case ID #     Office notes sent, clinical questions answered. Awaiting determination    Turnaround time for your insurance to make a decision on your Prior Authorization can take 7-21 business days.

## 2024-09-04 NOTE — TELEPHONE ENCOUNTER
PA for epinephrine 0.3 mg/0.3 ml  NOT REQUIRED     Reason (screenshot if applicable)          Patient advised by          [x] MyChart Message  [] Phone call   []LMOM  []L/M to call office as no active Communication consent on file  [x]Unable to leave detailed message as VM not approved on Communication consent       Pharmacy advised by    [x]Fax  []Phone call

## 2024-11-04 ENCOUNTER — OFFICE VISIT (OUTPATIENT)
Age: 34
End: 2024-11-04
Payer: COMMERCIAL

## 2024-11-04 VITALS — TEMPERATURE: 98.2 F | HEIGHT: 69 IN | WEIGHT: 200 LBS | BODY MASS INDEX: 29.62 KG/M2

## 2024-11-04 DIAGNOSIS — L82.1 SEBORRHEIC KERATOSIS: ICD-10-CM

## 2024-11-04 DIAGNOSIS — D36.9 ANGIOFIBROMA: ICD-10-CM

## 2024-11-04 DIAGNOSIS — D22.62 MULTIPLE BENIGN MELANOCYTIC NEVI OF BOTH UPPER EXTREMITIES, BOTH LOWER EXTREMITIES, AND TRUNK: Primary | ICD-10-CM

## 2024-11-04 DIAGNOSIS — D22.72 MULTIPLE BENIGN MELANOCYTIC NEVI OF BOTH UPPER EXTREMITIES, BOTH LOWER EXTREMITIES, AND TRUNK: Primary | ICD-10-CM

## 2024-11-04 DIAGNOSIS — D18.01 CHERRY ANGIOMA: ICD-10-CM

## 2024-11-04 DIAGNOSIS — D22.71 MULTIPLE BENIGN MELANOCYTIC NEVI OF BOTH UPPER EXTREMITIES, BOTH LOWER EXTREMITIES, AND TRUNK: Primary | ICD-10-CM

## 2024-11-04 DIAGNOSIS — Z12.83 SCREENING FOR SKIN CANCER: ICD-10-CM

## 2024-11-04 DIAGNOSIS — D22.5 MULTIPLE BENIGN MELANOCYTIC NEVI OF BOTH UPPER EXTREMITIES, BOTH LOWER EXTREMITIES, AND TRUNK: Primary | ICD-10-CM

## 2024-11-04 DIAGNOSIS — L72.0 MILIA: ICD-10-CM

## 2024-11-04 DIAGNOSIS — L81.4 LENTIGO: ICD-10-CM

## 2024-11-04 DIAGNOSIS — D22.61 MULTIPLE BENIGN MELANOCYTIC NEVI OF BOTH UPPER EXTREMITIES, BOTH LOWER EXTREMITIES, AND TRUNK: Primary | ICD-10-CM

## 2024-11-04 PROCEDURE — 99203 OFFICE O/P NEW LOW 30 MIN: CPT | Performed by: DERMATOLOGY

## 2024-11-04 NOTE — PATIENT INSTRUCTIONS
SEBORRHEIC KERATOSES  - Relevant exam: Scattered over the trunk/extremities are waxy brown to black plaques and papules with stuck on appearance and consistent dermoscopy  - Exam and clinical history consistent with seborrheic keratoses  - Counseled that these are benign growths that do not require treatment    MELANOCYTIC NEVI  -Relevant exam: Scattered over the trunk/extremities are homogenously pigmented brown macules and papules. ELM performed and without concerning findings. No outliers unless otherwise noted in today's note  - Exam and clinical history consistent with melanocytic nevi  - Counseled to return to clinic prior to scheduled appointment should any of these lesions change or should any new lesions of concern arise  - Counseled on use of sun protection daily. Reviewed latest FDA sunscreen guidelines, including use of broad spectrum (UVA and UVB blocking) sunscreen or sun protective clothing with SPF 30-50 every 2-3 hours and reapplied after exposure to water    LENTIGINES  OTHER SKIN CHANGES DUE TO CHRONIC EXPOSURE TO NONIONIZING RADIATION  - Relevant exam: Over sun exposed areas are brown macules. ELM performed and without concerning findings.  - Exam and clinical history consistent with lentigines.  - Counseled to return to clinic prior to scheduled appointment should any of these lesions change or should any new lesions of concern arise.  - Recommended use of sunscreen as above and below.    CHERRY ANGIOMAS  - Relevant exam: Scattered over the trunk/extremities are red papules  - Exam and clinical history consistent with cherry angiomas  - Educated that these are benign    MILIUM       Assessment and Plan:  Based on a thorough discussion of this condition and the management approach to it (including a comprehensive discussion of the known risks, side effects and potential benefits of treatment), the patient (family) agrees to implement the following specific plan:  Discussed using Differin over the  counter apply every other night may be irritating to the skin and eye.    Assessment and Plan  A milium is a small cyst containing keratin (the skin protein); they are usually multiple and are then known as milia. These harmless cysts present as tiny pearly-white bumps just under the surface of the skin.  Milia are common in all ages and both sexes. They most often arise on the face and are particularly prominent on the eyelids and cheeks, but they may occur elsewhere.  There are various kinds of milia.   milia: Affect 40-50% of  babies, few to numerous lesions, often seen on the nose, but may also arise inside the mouth on the mucosa (Olga pearls) or palate (Pearl nodules) or more widely on the scalp, face and upper trunk, Heal spontaneously within a few weeks of birth.  Primary milia in children and adults: found around eyelids, cheeks, forehead and genitalia, in young children, a row of milia may appear along the nasal crease, may clear in a few weeks or persist for months or longer.    Juvenile milia: associated with Rombo syndrome, basal cell naevus syndrome, Jjipn-Ceglt-Ufkigyei syndrome, pachyonychia congenita, Santos syndrome and other genetic disorders, may be congenital (present at birth) or appear later in life.  Milia en plaque: multiple milia appear on within an inflamed plaque up to several centimeters in diameter, usually found on an eyelid, behind the ear, on a cheek or jaw.  3. Affect children and adults, especially middle-aged women.  4. Sometimes associated with another skin disease including pseudoxanthoma elasticum, discoid lupus erythematosus, lichen planus.  Multiple eruptive milia: crops of numerous milia appear over a few weeks to months, lesions may be asymptomatic or itchy, most often affect the face, upper arms and upper trunk.  Traumatic milia: occur at the site of injury as skin heals, arise from eccrine sweat ducts, examples include thermal burns, dermabrasion,  "blistering rashes such as bullous pemphigoid, often seen on the back of hands and fingers in porphyria cutanea tarda, a milia-like calcified nodule may develop after  heel stick blood test.   Milia associated with drugs: may rarely follow the use of topical medication, such as phenols, hydroquinone, 5-fluorouracil cream, and a corticosteroid.    Milia have a characteristic appearance. However, on occasion, a skin biopsy may be performed. This shows a small epidermoid cyst coming from a vellus hair follicle.  Milia should be distinguished from other types of cyst, comedones, xanthelasma and syringomas. Colloid milia are pierce coloured bumps on cheeks and temples associated with excessive exposure to sunlight.  They should also be distinguished from milia-like cysts noted on dermoscopy in seborrhoeic keratoses, papillomatous moles and some basal cell carcinomas.  Milia do not need to be treated unless they are a cause for concern for the patient. They often clear up by themselves within a few months. Where possible, further trauma should be minimised to reduce the development of new lesions.  The lesion may be de-roofed using a sterile needle or blade and the contents squeezed or pricked out.  They may be destroyed using diathermy and curettage, or cryotherapy.  For widespread lesions, topical retinoids may be helpful.  Chemical peels, dermabrasion and laser ablation have been reported to be effective when used for very extensive milia.  Milia en plaque may improve with minocycline (a tetracycline antibiotic).     FIBROUS PAPULE (\"ANGIOFIBROMA\")      Assessment and Plan:  Based on a thorough discussion of this condition and the management approach to it (including a comprehensive discussion of the known risks, side effects and potential benefits of treatment), the patient (family) agrees to implement the following specific plan:  Assured benign    A fibrous papule is a firm bump that most often occurs on the " nose. It is very common and has a characteristic appearance under the microscope.    A fibrous papule develops during late adolescence or early adult life on the nose, or less often, elsewhere on the face. It is a dome shaped shiny bump measuring 2-6 mm in diameter, sometimes with a central hair. Although it looks similar to a skin-colored mole (dermal nevus), it is firmer in texture. It is harmless but will not go away on its own.   It is important to distinguish fibrous papule from basal cell carcinoma, which may also present as a firm shiny bump. Basal cell carcinoma most often arises later in life. It grows slowly and tends to bleed and ulcerate.     A fibrous papule is diagnosed either clinically or by skin biopsy. There are distinctive features  on pathology (proliferation of fibroblasts, fibrotic stroma, and dilated blood vessels).      Fibrous papule is considered a nevus, and develops spontaneously. The precise reason is unknown.  Fibrous papules do not require any treatment. If desired it may be removed by a minor surgical procedure (excision biopsy, shave biopsy or electrosurgery).

## 2024-11-04 NOTE — PROGRESS NOTES
"[facility] Note     Patient Name: Akira Lucero  Encounter Date: 11/4/24     Have you been cared for by a [facility] Dermatologist in the last 3 years and, if so, which description applies to you?    NO.   I am considered a \"new\" patient and must complete all patient intake questions. I am MALE/not capable of bearing children.    REVIEW OF SYSTEMS:  Have you recently had or currently have any of the following? Recent fever or chills? No  Any non-healing wound? No   PAST MEDICAL HISTORY:  Have you personally ever had or currently have any of the following?  If \"YES,\" then please provide more detail. Skin cancer (such as Melanoma, Basal Cell Carcinoma, Squamous Cell Carcinoma?  No  Tuberculosis, HIV/AIDS, Hepatitis B or C: No  Radiation Treatment No   HISTORY OF IMMUNOSUPPRESSION:   Do you have a history of any of the following:  Systemic Immunosuppression such as Diabetes, Biologic or Immunotherapy, Chemotherapy, Organ Transplantation, Bone Marrow Transplantation or Prednisone?  No     Answering \"YES\" requires the addition of the dotphrase \"IMMUNOSUPPRESSED\" as the first diagnosis of the patient's visit.   FAMILY HISTORY:  Any \"first degree relatives\" (parent, brother, sister, or child) with the following?    Skin Cancer, Pancreatic or Other Cancer? YES, sister breast cancer   PATIENT EXPERIENCE:    Do you want the Dermatologist to perform a COMPLETE skin exam today including a clinical examination under the \"bra and underwear\" areas?  Yes  If necessary, do we have your permission to call and leave a detailed message on your Preferred Phone number that includes your specific medical information?  Yes      Allergies   Allergen Reactions    Bee Venom Hives      Current Outpatient Medications:     EPINEPHrine (EpiPen 2-Onesimo) 0.3 mg/0.3 mL SOAJ, Inject 0.3 mL (0.3 mg total) into a muscle once for 1 dose Bee allergy, Disp: 1.2 mL, Rfl: 5    fluticasone (FLONASE) 50 mcg/act nasal spray, 50 mcg into each " nostril if needed, Disp: , Rfl:     loratadine (CLARITIN) 10 mg tablet, Take 10 mg by mouth if needed, Disp: , Rfl:     montelukast (SINGULAIR) 10 mg tablet, Take 1 tablet (10 mg total) by mouth daily at bedtime (Patient taking differently: Take 10 mg by mouth if needed), Disp: 90 tablet, Rfl: 0    Multiple Vitamin (MULTIVITAMIN ADULT PO), Take by mouth, Disp: , Rfl:           Whom besides the patient is providing clinical information about today's encounter?   NO ADDITIONAL HISTORIAN (patient alone provided history)    Physical Exam and Assessment/Plan by Diagnosis:    SEBORRHEIC KERATOSES  - Relevant exam: Scattered over the trunk/extremities are waxy brown to black plaques and papules with stuck on appearance and consistent dermoscopy  - Exam and clinical history consistent with seborrheic keratoses  - Counseled that these are benign growths that do not require treatment    MELANOCYTIC NEVI  -Relevant exam: Scattered over the trunk/extremities are homogenously pigmented brown macules and papules. ELM performed and without concerning findings. No outliers unless otherwise noted in today's note  - Exam and clinical history consistent with melanocytic nevi  - Counseled to return to clinic prior to scheduled appointment should any of these lesions change or should any new lesions of concern arise  - Counseled on use of sun protection daily. Reviewed latest FDA sunscreen guidelines, including use of broad spectrum (UVA and UVB blocking) sunscreen or sun protective clothing with SPF 30-50 every 2-3 hours and reapplied after exposure to water    LENTIGINES  OTHER SKIN CHANGES DUE TO CHRONIC EXPOSURE TO NONIONIZING RADIATION  - Relevant exam: Over sun exposed areas are brown macules. ELM performed and without concerning findings.  - Exam and clinical history consistent with lentigines.  - Counseled to return to clinic prior to scheduled appointment should any of these lesions change or should any new lesions of concern  arise.  - Recommended use of sunscreen as above and below.    CHERRY ANGIOMAS  - Relevant exam: Scattered over the trunk/extremities are red papules  - Exam and clinical history consistent with cherry angiomas  - Educated that these are benign    MILIUM     Physical Exam:  Anatomic Location Affected:  left lower eye  Morphological Description:  1-2 mm papules  Pertinent Positives:  Pertinent Negatives:    Additional History of Present Condition:  present on exam    Assessment and Plan:  Based on a thorough discussion of this condition and the management approach to it (including a comprehensive discussion of the known risks, side effects and potential benefits of treatment), the patient (family) agrees to implement the following specific plan:  Discussed using Differin over the counter apply every other night may be irritating to the skin     Assessment and Plan  A milium is a small cyst containing keratin (the skin protein); they are usually multiple and are then known as milia. These harmless cysts present as tiny pearly-white bumps just under the surface of the skin.  Milia are common in all ages and both sexes. They most often arise on the face and are particularly prominent on the eyelids and cheeks, but they may occur elsewhere.  There are various kinds of milia.   milia: Affect 40-50% of  babies, few to numerous lesions, often seen on the nose, but may also arise inside the mouth on the mucosa (Olga pearls) or palate (Pearl nodules) or more widely on the scalp, face and upper trunk, Heal spontaneously within a few weeks of birth.  Primary milia in children and adults: found around eyelids, cheeks, forehead and genitalia, in young children, a row of milia may appear along the nasal crease, may clear in a few weeks or persist for months or longer.    Juvenile milia: associated with Rombo syndrome, basal cell naevus syndrome, Injtp-Nybaj-Wqzbkmny syndrome, pachyonychia congenita, Santos  syndrome and other genetic disorders, may be congenital (present at birth) or appear later in life.  Milia en plaque: multiple milia appear on within an inflamed plaque up to several centimeters in diameter, usually found on an eyelid, behind the ear, on a cheek or jaw.  3. Affect children and adults, especially middle-aged women.  4. Sometimes associated with another skin disease including pseudoxanthoma elasticum, discoid lupus erythematosus, lichen planus.  Multiple eruptive milia: crops of numerous milia appear over a few weeks to months, lesions may be asymptomatic or itchy, most often affect the face, upper arms and upper trunk.  Traumatic milia: occur at the site of injury as skin heals, arise from eccrine sweat ducts, examples include thermal burns, dermabrasion, blistering rashes such as bullous pemphigoid, often seen on the back of hands and fingers in porphyria cutanea tarda, a milia-like calcified nodule may develop after  heel stick blood test.   Milia associated with drugs: may rarely follow the use of topical medication, such as phenols, hydroquinone, 5-fluorouracil cream, and a corticosteroid.    Milia have a characteristic appearance. However, on occasion, a skin biopsy may be performed. This shows a small epidermoid cyst coming from a vellus hair follicle.  Milia should be distinguished from other types of cyst, comedones, xanthelasma and syringomas. Colloid milia are pierce coloured bumps on cheeks and temples associated with excessive exposure to sunlight.  They should also be distinguished from milia-like cysts noted on dermoscopy in seborrhoeic keratoses, papillomatous moles and some basal cell carcinomas.  Milia do not need to be treated unless they are a cause for concern for the patient. They often clear up by themselves within a few months. Where possible, further trauma should be minimised to reduce the development of new lesions.  The lesion may be de-roofed using a sterile needle  "or blade and the contents squeezed or pricked out.  They may be destroyed using diathermy and curettage, or cryotherapy.  For widespread lesions, topical retinoids may be helpful.  Chemical peels, dermabrasion and laser ablation have been reported to be effective when used for very extensive milia.  Milia en plaque may improve with minocycline (a tetracycline antibiotic).     FIBROUS PAPULE (\"ANGIOFIBROMA\")    Physical Exam:  Anatomic Location Affected:  right side of the nose  Morphological Description:  fibrous papule  Pertinent Positives:  Pertinent Negatives:    Additional History of Present Condition:  Patient presents for a full skin check with some areas of concern    Assessment and Plan:  Based on a thorough discussion of this condition and the management approach to it (including a comprehensive discussion of the known risks, side effects and potential benefits of treatment), the patient (family) agrees to implement the following specific plan:  Assured benign    A fibrous papule is a firm bump that most often occurs on the nose. It is very common and has a characteristic appearance under the microscope.    A fibrous papule develops during late adolescence or early adult life on the nose, or less often, elsewhere on the face. It is a dome shaped shiny bump measuring 2-6 mm in diameter, sometimes with a central hair. Although it looks similar to a skin-colored mole (dermal nevus), it is firmer in texture. It is harmless but will not go away on its own.   It is important to distinguish fibrous papule from basal cell carcinoma, which may also present as a firm shiny bump. Basal cell carcinoma most often arises later in life. It grows slowly and tends to bleed and ulcerate.     A fibrous papule is diagnosed either clinically or by skin biopsy. There are distinctive features  on pathology (proliferation of fibroblasts, fibrotic stroma, and dilated blood vessels).      Fibrous papule is considered a nevus, and " develops spontaneously. The precise reason is unknown.  Fibrous papules do not require any treatment. If desired it may be removed by a minor surgical procedure (excision biopsy, shave biopsy or electrosurgery).   Scribe Attestation      I,:  Radha Bernal MA am acting as a scribe while in the presence of the attending physician.:       I,:  Joanna Gaitan MD personally performed the services described in this documentation    as scribed in my presence.:

## 2025-03-07 LAB
ALBUMIN SERPL-MCNC: 4.7 G/DL (ref 4.1–5.1)
ALP SERPL-CCNC: 57 IU/L (ref 44–121)
ALT SERPL-CCNC: 47 IU/L (ref 0–44)
AST SERPL-CCNC: 26 IU/L (ref 0–40)
BILIRUB SERPL-MCNC: 2.8 MG/DL (ref 0–1.2)
BUN SERPL-MCNC: 13 MG/DL (ref 6–20)
BUN/CREAT SERPL: 14 (ref 9–20)
CALCIUM SERPL-MCNC: 9.6 MG/DL (ref 8.7–10.2)
CHLORIDE SERPL-SCNC: 103 MMOL/L (ref 96–106)
CHOLEST SERPL-MCNC: 155 MG/DL (ref 100–199)
CO2 SERPL-SCNC: 25 MMOL/L (ref 20–29)
CREAT SERPL-MCNC: 0.94 MG/DL (ref 0.76–1.27)
EGFR: 110 ML/MIN/1.73
GLOBULIN SER-MCNC: 2.3 G/DL (ref 1.5–4.5)
GLUCOSE SERPL-MCNC: 90 MG/DL (ref 70–99)
HDLC SERPL-MCNC: 41 MG/DL
LDLC SERPL CALC-MCNC: 90 MG/DL (ref 0–99)
MICRODELETION SYND BLD/T FISH: NORMAL
POTASSIUM SERPL-SCNC: 4.3 MMOL/L (ref 3.5–5.2)
PROT SERPL-MCNC: 7 G/DL (ref 6–8.5)
SODIUM SERPL-SCNC: 140 MMOL/L (ref 134–144)
TRIGL SERPL-MCNC: 137 MG/DL (ref 0–149)

## 2025-03-10 ENCOUNTER — RESULTS FOLLOW-UP (OUTPATIENT)
Dept: FAMILY MEDICINE CLINIC | Facility: CLINIC | Age: 35
End: 2025-03-10

## 2025-04-08 ENCOUNTER — APPOINTMENT (OUTPATIENT)
Dept: RADIOLOGY | Facility: CLINIC | Age: 35
End: 2025-04-08
Payer: COMMERCIAL

## 2025-04-08 VITALS — HEIGHT: 69 IN | WEIGHT: 200 LBS | BODY MASS INDEX: 29.62 KG/M2

## 2025-04-08 DIAGNOSIS — M77.12 LATERAL EPICONDYLITIS OF LEFT ELBOW: Primary | ICD-10-CM

## 2025-04-08 DIAGNOSIS — M25.522 LEFT ELBOW PAIN: ICD-10-CM

## 2025-04-08 DIAGNOSIS — S39.012A STRAIN OF LUMBAR PARASPINOUS MUSCLE, INITIAL ENCOUNTER: ICD-10-CM

## 2025-04-08 PROCEDURE — 73080 X-RAY EXAM OF ELBOW: CPT

## 2025-04-08 PROCEDURE — 99203 OFFICE O/P NEW LOW 30 MIN: CPT | Performed by: ORTHOPAEDIC SURGERY

## 2025-04-08 NOTE — PROGRESS NOTES
Patient Name: Akira Lucero      : 1990       MRN: [id number]   Encounter Provider: Terrance Mendez MD   Encounter Date: 25  Encounter department: [facility]         Assessment & Plan  Lateral epicondylitis of left elbow    Orders:  •  Brace  •  Ambulatory Referral to Occupational Therapy; Future    Left elbow pain    Orders:  •  XR elbow 3+ vw left; Future  •  Brace  •  Ambulatory Referral to Occupational Therapy; Future    Strain of lumbar paraspinous muscle, initial encounter            Plan: Akira is a pleasant 34-year-old male who presents today for initial evaluation of his left elbow pain.  Upon examination and x-ray review patient shows signs and symptoms consistent with lateral epicondylitis.  I explained to the patient he has tennis elbow and conservative treatment he can utilize such as formal physical therapy, home exercise program, wrist bracing at night, counterforce brace for activity and possible cortisone shot injection.  I did provide him a prescription today for for formal occupational therapy as well as home exercise program for his wrist.  He deferred a cortisone shot injection at this point.  I explained to him he can also utilize Voltaren gel over the area 3-4 times a day for the next 2 weeks.  He can continue using ice and heat of his elbow.  If his condition worsens or does not improve we may utilize a cortisone shot injection.  Towards the end of the visit he explained he had low back pain which has been aggravated the last 2 weeks due to sitting with his  child.  I explained to him that he shows signs consistent with a paraspinal muscle spasm.  He can utilize heat and home exercise program for this.  If his condition does not improve 6 to 8 weeks I will refer him to spine and pain management.  Patient showed understanding of the current plan, all of his questions concerns were addressed today.      Follow-up:  Return in about 8  weeks (around 6/3/2025).     _____________________________________________________  CHIEF COMPLAINT:  Chief Complaint   Patient presents with   • Left Elbow - Pain         SUBJECTIVE:  Akira Lucero is a 34 y.o. male who presents for initial evaluation of his left elbow pain.  Patient states the pain started in December however it has recently been bothering him more within the past few weeks.  He has no injury or trauma to the area that he can recall.  He notices that weightbearing on the left arm will increase his pain such as performing push-ups or pushing himself out of the chair.  He does go to the gym frequently however he is to modify what he does as now cause pain.  He occasionally use over-the-counter medicine as needed for relief.  He denies any distal paresthesias.        PAST MEDICAL HISTORY:  History reviewed. No pertinent past medical history.    PAST SURGICAL HISTORY:  Past Surgical History:   Procedure Laterality Date   • ANTERIOR CRUCIATE LIGAMENT REPAIR Bilateral    • NASAL SEPTUM SURGERY N/A    • WISDOM TOOTH EXTRACTION N/A        FAMILY HISTORY:  Family History   Problem Relation Age of Onset   • No Known Problems Mother    • Hypertension Father    • Breast cancer Sister    • Mental illness Neg Hx        SOCIAL HISTORY:  Social History     Tobacco Use   • Smoking status: Never     Passive exposure: Never   • Smokeless tobacco: Never   Vaping Use   • Vaping status: Never Used   Substance Use Topics   • Alcohol use: Yes     Alcohol/week: 1.0 standard drink of alcohol     Types: 1 Cans of beer per week   • Drug use: Never       MEDICATIONS:    Current Outpatient Medications:   •  fluticasone (FLONASE) 50 mcg/act nasal spray, 50 mcg into each nostril if needed, Disp: , Rfl:   •  loratadine (CLARITIN) 10 mg tablet, Take 10 mg by mouth if needed, Disp: , Rfl:   •  montelukast (SINGULAIR) 10 mg tablet, Take 1 tablet (10 mg total) by mouth daily at bedtime (Patient taking differently: Take 10 mg by mouth  "if needed), Disp: 90 tablet, Rfl: 0  •  Multiple Vitamin (MULTIVITAMIN ADULT PO), Take by mouth, Disp: , Rfl:   •  EPINEPHrine (EpiPen 2-Onesimo) 0.3 mg/0.3 mL SOAJ, Inject 0.3 mL (0.3 mg total) into a muscle once for 1 dose Bee allergy, Disp: 1.2 mL, Rfl: 5    ALLERGIES:  Allergies   Allergen Reactions   • Bee Venom Hives       LABS:  HgA1c: No results found for: \"HGBA1C\"  BMP:   Lab Results   Component Value Date    K 4.3 03/07/2025    CO2 25 03/07/2025     03/07/2025    BUN 13 03/07/2025    CREATININE 0.94 03/07/2025     CBC: No components found for: \"CBC\"    _____________________________________________________  Review of Systems   Constitutional:  Negative for chills and fever.   HENT:  Negative for ear pain and sore throat.    Eyes:  Negative for pain and visual disturbance.   Respiratory:  Negative for cough and shortness of breath.    Cardiovascular:  Negative for chest pain and palpitations.   Gastrointestinal:  Negative for abdominal pain and vomiting.   Genitourinary:  Negative for dysuria and hematuria.   Musculoskeletal:  Negative for arthralgias and back pain.   Skin:  Negative for color change and rash.   Neurological:  Negative for seizures and syncope.   All other systems reviewed and are negative.       Left Elbow Exam     Tenderness   The patient is experiencing tenderness in the lateral epicondyle.     Range of Motion   Extension:  0   Flexion:  140   Pronation:  20   Supination:  20     Muscle Strength   Pronation:  5/5   Supination:  5/5     Tests   Varus: negative  Valgus: negative    Other   Erythema: absent    Comments:  TTP over lateral epicondyle              Physical Exam  Vitals and nursing note reviewed.   Constitutional:       Appearance: Normal appearance.   HENT:      Head: Normocephalic and atraumatic.      Right Ear: External ear normal.      Left Ear: External ear normal.      Nose: Nose normal.   Eyes:      General: No scleral icterus.     Extraocular Movements: Extraocular " movements intact.      Conjunctiva/sclera: Conjunctivae normal.   Cardiovascular:      Rate and Rhythm: Normal rate.   Pulmonary:      Effort: Pulmonary effort is normal. No respiratory distress.   Musculoskeletal:      Cervical back: Normal range of motion and neck supple.      Comments: See ortho exam   Skin:     General: Skin is warm and dry.   Neurological:      Mental Status: He is alert and oriented to person, place, and time.   Psychiatric:         Mood and Affect: Mood normal.         Behavior: Behavior normal.        _____________________________________________________  STUDIES REVIEWED:  I personally reviewed the pertinent images and my independent interpretation is as follows: X-rays of the left elbow obtained on 4/8/2025 demonstrate no obvious osseous abnormalities, no lytic or blastic lesions.  Adequate joint spacing of humeral ulnar joint.      PROCEDURES PERFORMED:  No Procedures performed today    Scribe Attestation    I,:  Ottoniel Ruano am acting as a scribe while in the presence of the attending physician.:       I,:  Terrance Mendez MD personally performed the services described in this documentation    as scribed in my presence.:

## 2025-04-09 NOTE — ASSESSMENT & PLAN NOTE
Orders:  •  XR elbow 3+ vw left; Future  •  Brace  •  Ambulatory Referral to Occupational Therapy; Future